# Patient Record
Sex: MALE | Race: WHITE | Employment: FULL TIME | URBAN - NONMETROPOLITAN AREA
[De-identification: names, ages, dates, MRNs, and addresses within clinical notes are randomized per-mention and may not be internally consistent; named-entity substitution may affect disease eponyms.]

---

## 2020-08-07 ENCOUNTER — VIRTUAL VISIT (OUTPATIENT)
Dept: FAMILY MEDICINE | Facility: OTHER | Age: 50
End: 2020-08-07
Attending: PHYSICIAN ASSISTANT
Payer: COMMERCIAL

## 2020-08-07 DIAGNOSIS — Z20.822 EXPOSURE TO COVID-19 VIRUS: Primary | ICD-10-CM

## 2020-08-07 DIAGNOSIS — Z20.822 COVID-19 RULED OUT: ICD-10-CM

## 2020-08-07 PROCEDURE — 99212 OFFICE O/P EST SF 10 MIN: CPT | Mod: 95 | Performed by: PHYSICIAN ASSISTANT

## 2020-08-07 PROCEDURE — U0003 INFECTIOUS AGENT DETECTION BY NUCLEIC ACID (DNA OR RNA); SEVERE ACUTE RESPIRATORY SYNDROME CORONAVIRUS 2 (SARS-COV-2) (CORONAVIRUS DISEASE [COVID-19]), AMPLIFIED PROBE TECHNIQUE, MAKING USE OF HIGH THROUGHPUT TECHNOLOGIES AS DESCRIBED BY CMS-2020-01-R: HCPCS | Mod: ZL | Performed by: PHYSICIAN ASSISTANT

## 2020-08-07 PROCEDURE — C9803 HOPD COVID-19 SPEC COLLECT: HCPCS

## 2020-08-07 PROCEDURE — 99207 ZZC NO CHARGE NURSE ONLY: CPT

## 2020-08-07 NOTE — NURSING NOTE
Patient was notified that a co worker tested positive for COVID.  Kayley Borges LPN ....................  8/7/2020   11:08 AM

## 2020-08-07 NOTE — NURSING NOTE
Chief Complaint   Patient presents with     Covid 19 Testing       Patient swabbed for COVID-19 testing.  Tomer Carvajal LPN on 8/7/2020 at 12:00 PM

## 2020-08-07 NOTE — PROGRESS NOTES
"Angel Self is a 49 year old male who is being evaluated via a billable telephone visit.      The patient has been notified of following:     \"This telephone visit will be conducted via a call between you and your physician/provider. We have found that certain health care needs can be provided without the need for a physical exam.  This service lets us provide the care you need with a short phone conversation.  If a prescription is necessary we can send it directly to your pharmacy.  If lab work is needed we can place an order for that and you can then stop by our lab to have the test done at a later time.    Telephone visits are billed at different rates depending on your insurance coverage. During this emergency period, for some insurers they may be billed the same as an in-person visit.  Please reach out to your insurance provider with any questions.    If during the course of the call the physician/provider feels a telephone visit is not appropriate, you will not be charged for this service.\"    Patient has given verbal consent for Telephone visit?  Yes    What phone number would you like to be contacted at? [phone]    How would you like to obtain your AVS? My Chart    Subjective     Angel Self is a 49 year old male who presents via phone visit today for the following health issues:    HPI  Patient is contacted via telephone for consideration of COVID-19 testing. Patient notes he has recently spent a prolonged period of time in close contact with a co-worker at work and recently found out that individual tested positive for COVID. He works at [employer]. He is currently asymptomatic. No history of pulmonary disease. Non smoker. Denies fever/chills, cough, sore throat, shortness of breath, wheezing, muscle or body aches, headaches, GI symptoms, rash.       PAST MEDICAL HISTORY: No past medical history on file.    PAST SURGICAL HISTORY: No past surgical history on file.    FAMILY HISTORY: No family " history on file.    SOCIAL HISTORY:   Social History     Tobacco Use     Smoking status: Never Smoker     Smokeless tobacco: Never Used   Substance Use Topics     Alcohol use: Yes      No Known Allergies    No current outpatient medications on file.     No current facility-administered medications for this visit.        Reviewed and updated as needed this visit by Provider         Review of Systems   Constitutional, HEENT, cardiovascular, pulmonary, gi and gu systems are negative, except as otherwise noted.       Objective   Reported vitals:  There were no vitals taken for this visit.   healthy, alert and no distress  PSYCH: Alert and oriented times 3; coherent speech, normal   rate and volume, able to articulate logical thoughts, able   to abstract reason, no tangential thoughts, no hallucinations   or delusions  His affect is normal  RESP: No cough, no audible wheezing, able to talk in full sentences  Remainder of exam unable to be completed due to telephone visits          Assessment/Plan:  1. Exposure to COVID-19 virus    2. COVID-19 ruled out      Patient meets criteria for COVID-19 testing. They are informed to self quarantine until results are available. They have been provided information to complete curbside testing. Will notify with results. If positive, patient is to self-quarantine at home for 14 days.       Phone call duration:  5 minutes    Pascale Mendez PA-C

## 2020-08-07 NOTE — PATIENT INSTRUCTIONS
"Discharge Instructions for COVID-19 Patients  You have--or may have--COVID-19. Please follow the instructions listed below.   If you have a weakened immune system, discuss with your doctor any other actions you need to take.  How can I protect others?  If you have symptoms (fever, cough, body aches or trouble breathing):    Stay home and away from others (self-isolate) until:  ? At least 10 days have passed since your symptoms started. And   ? You've had no fever--and no medicine that reduces fever--for 3 full days (72 hours). And   ? Your other symptoms have resolved (gotten better).  If you don't show symptoms, but testing showed that you have COVID-19:    Stay home and away from others (self-isolate) until at least 10 days have passed since the date of your first positive COVID-19 test.  During this time    Stay in your own room, even for meals. Use your own bathroom if you can.    Stay away from others in your home. No hugging, kissing or shaking hands. No visitors.    Don't go to work, school or anywhere else.    Clean \"high touch\" surfaces often (doorknobs, counters, handles). Use household cleaning spray or wipes. You'll find a full list of  on the EPA website: www.epa.gov/pesticide-registration/list-n-disinfectants-use-against-sars-cov-2.    Cover your mouth and nose with a mask, tissue or wash cloth to avoid spreading germs.    Wash your hands and face often. Use soap and water.    Caregivers in these groups are at risk for severe illness due to COVID-19:  ? People 65 years and older  ? People who live in a nursing home or long-term care facility  ? People with chronic disease (lung, heart, cancer, diabetes, kidney, liver, immunologic)  ? People who have a weakened immune system, including those who:    Are in cancer treatment    Take medicine that weakens the immune system, such as corticosteroids    Had a bone marrow or organ transplant    Have an immune deficiency    Have poorly controlled HIV or " AIDS    Are obese (body mass index of 40 or higher)    Smoke regularly    Caregivers should wear gloves while washing dishes, handling laundry and cleaning bedrooms and bathrooms.    Use caution when washing and drying laundry: Don't shake dirty laundry and use the warmest water setting that you can.    For more tips on managing your health at home, go to www.cdc.gov/coronavirus/2019-ncov/downloads/10Things.pdf.  How can I take care of myself at home?  1. Get lots of rest. Drink extra fluids (unless a doctor has told you not to).  2. Take Tylenol (acetaminophen) for fever or pain. If you have liver or kidney problems, ask your family doctor if it's okay to take Tylenol.     Adults can take either:  ? 650 mg (two 325 mg pills) every 4 to 6 hours, or   ? 1,000 mg (two 500 mg pills) every 8 hours as needed.  ? Note: Don't take more than 3,000 mg in one day. Acetaminophen is found in many medicines (both prescribed and over-the-counter medicines). Read all labels to be sure you don't take too much.   For children, check the Tylenol bottle for the right dose. The dose is based on the child's age or weight.  3. If you have other health problems (like cancer, heart failure, an organ transplant or severe kidney disease): Call your specialty clinic if you don't feel better in the next 2 days.  4. Know when to call 911. Emergency warning signs include:  ? Trouble breathing or shortness of breath  ? Pain or pressure in the chest that doesn't go away  ? Feeling confused like you haven't felt before, or not being able to wake up  ? Bluish-colored lips or face  5. Your doctor may have prescribed a blood thinner medicine. Follow their instructions.  Where can I get more information?     [facility] - About COVID-19:   [email or web address]    CDC - What to Do If You're Sick: www.cdc.gov/coronavirus/2019-ncov/about/steps-when-sick.html    CDC - Ending Home Isolation:  www.cdc.gov/coronavirus/2019-ncov/hcp/disposition-in-home-patients.html    CDC - Caring for Someone: www.cdc.gov/coronavirus/2019-ncov/if-you-are-sick/care-for-someone.html    [facility] - Interim Guidance for Hospital Discharge to Home: www.[facility].[place]/diseases/coronavirus/hcp/hospdischarge.pdf    [facility] clinical trials (COVID-19 research studies): clinicalaffairs.[facility]-clinical-trials    Below are the COVID-19 hotlines at the [place] Department of [facility] ([facility]). Interpreters are available.  ? For health questions: Call 899-676-1790 or 1-590.874.4024 (7 a.m. to 7 p.m.)  ? For questions about schools and childcare: Call 263-941-9781 or 1-183.894.3790 (7 a.m. to 7 p.m.)    For informational purposes only. Not to replace the advice of your health care provider. Clinically reviewed by the Infection Prevention Team.Copyright   2020 [facility]. All rights reserved. Accountable [id number] - 06/20.

## 2020-08-08 LAB
SARS-COV-2 RNA SPEC QL NAA+PROBE: NOT DETECTED
SPECIMEN SOURCE: NORMAL

## 2020-09-05 ENCOUNTER — OFFICE VISIT (OUTPATIENT)
Dept: FAMILY MEDICINE | Facility: OTHER | Age: 50
End: 2020-09-05
Attending: NURSE PRACTITIONER
Payer: COMMERCIAL

## 2020-09-05 VITALS
OXYGEN SATURATION: 98 % | HEART RATE: 84 BPM | RESPIRATION RATE: 16 BRPM | SYSTOLIC BLOOD PRESSURE: 124 MMHG | BODY MASS INDEX: 29.16 KG/M2 | TEMPERATURE: 99.2 F | DIASTOLIC BLOOD PRESSURE: 74 MMHG | HEIGHT: 73 IN | WEIGHT: 220 LBS

## 2020-09-05 DIAGNOSIS — M25.571 PAIN AND SWELLING OF RIGHT ANKLE: ICD-10-CM

## 2020-09-05 DIAGNOSIS — L03.115 CELLULITIS OF RIGHT ANKLE: Primary | ICD-10-CM
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
## 2020-09-05 DIAGNOSIS — M25.471 PAIN AND SWELLING OF RIGHT ANKLE: ICD-10-CM

## 2020-09-05 DIAGNOSIS — S80.811A CAT SCRATCH OF LOWER LEG, RIGHT, INITIAL ENCOUNTER: ICD-10-CM

## 2020-09-05 LAB
BASOPHILS # BLD AUTO: 0 10E9/L (ref 0–0.2)
BASOPHILS NFR BLD AUTO: 0.2 %
CRP SERPL-MCNC: 8.4 MG/L
DIFFERENTIAL METHOD BLD: NORMAL
EOSINOPHIL # BLD AUTO: 0 10E9/L (ref 0–0.7)
EOSINOPHIL NFR BLD AUTO: 0.1 %
ERYTHROCYTE [DISTWIDTH] IN BLOOD BY AUTOMATED COUNT: 12.7 % (ref 10–15)
HCT VFR BLD AUTO: 45 % (ref 40–53)
HGB BLD-MCNC: 15.3 G/DL (ref 13.3–17.7)
IMM GRANULOCYTES # BLD: 0.1 10E9/L (ref 0–0.4)
IMM GRANULOCYTES NFR BLD: 0.5 %
LYMPHOCYTES # BLD AUTO: 4 10E9/L (ref 0.8–5.3)
LYMPHOCYTES NFR BLD AUTO: 36.7 %
MCH RBC QN AUTO: 30.2 PG (ref 26.5–33)
MCHC RBC AUTO-ENTMCNC: 34 G/DL (ref 31.5–36.5)
MCV RBC AUTO: 89 FL (ref 78–100)
MONOCYTES # BLD AUTO: 1.1 10E9/L (ref 0–1.3)
MONOCYTES NFR BLD AUTO: 9.7 %
NEUTROPHILS # BLD AUTO: 5.8 10E9/L (ref 1.6–8.3)
NEUTROPHILS NFR BLD AUTO: 52.8 %
PLATELET # BLD AUTO: 367 10E9/L (ref 150–450)
RBC # BLD AUTO: 5.07 10E12/L (ref 4.4–5.9)
URATE SERPL-MCNC: 6.2 MG/DL (ref 4.4–7.6)
WBC # BLD AUTO: 11 10E9/L (ref 4–11)

## 2020-09-05 PROCEDURE — 96372 THER/PROPH/DIAG INJ SC/IM: CPT

## 2020-09-05 PROCEDURE — 86140 C-REACTIVE PROTEIN: CPT | Mod: ZL | Performed by: NURSE PRACTITIONER

## 2020-09-05 PROCEDURE — 99214 OFFICE O/P EST MOD 30 MIN: CPT | Performed by: NURSE PRACTITIONER

## 2020-09-05 PROCEDURE — 25000128 H RX IP 250 OP 636: Performed by: NURSE PRACTITIONER

## 2020-09-05 PROCEDURE — 25000125 ZZHC RX 250: Performed by: NURSE PRACTITIONER

## 2020-09-05 PROCEDURE — 36415 COLL VENOUS BLD VENIPUNCTURE: CPT | Mod: ZL | Performed by: NURSE PRACTITIONER

## 2020-09-05 PROCEDURE — 85025 COMPLETE CBC W/AUTO DIFF WBC: CPT | Mod: ZL | Performed by: NURSE PRACTITIONER

## 2020-09-05 PROCEDURE — 84550 ASSAY OF BLOOD/URIC ACID: CPT | Mod: ZL | Performed by: NURSE PRACTITIONER

## 2020-09-05 PROCEDURE — 86618 LYME DISEASE ANTIBODY: CPT | Mod: ZL | Performed by: NURSE PRACTITIONER

## 2020-09-05 RX ORDER — CEFTRIAXONE SODIUM 1 G
1 VIAL (EA) INJECTION ONCE
Status: COMPLETED | OUTPATIENT
Start: 2020-09-05 | End: 2020-09-05

## 2020-09-05 RX ORDER — NAPROXEN 500 MG/1
500 TABLET ORAL 2 TIMES DAILY WITH MEALS
Qty: 28 TABLET | Refills: 0 | Status: SHIPPED | OUTPATIENT
Start: 2020-09-05 | End: 2020-09-19

## 2020-09-05 RX ADMIN — LIDOCAINE HYDROCHLORIDE 1 G: 10 INJECTION, SOLUTION EPIDURAL; INFILTRATION; INTRACAUDAL; PERINEURAL at 11:21

## 2020-09-05 ASSESSMENT — MIFFLIN-ST. JEOR: SCORE: 1926.79

## 2020-09-05 ASSESSMENT — PAIN SCALES - GENERAL: PAINLEVEL: SEVERE PAIN (6)

## 2020-09-05 NOTE — NURSING NOTE
"Patient presents to the clinic today with right ankle pain and swelling.  Lorrie Chong LPN 9/5/2020   10:39 AM    Chief Complaint   Patient presents with     Musculoskeletal Problem       Initial /74 (BP Location: Right arm, Patient Position: Sitting, Cuff Size: Adult Regular)   Pulse 84   Temp 99.2  F (37.3  C) (Temporal)   Resp 16   Ht 1.854 m (6' 1\")   Wt 99.8 kg (220 lb)   SpO2 98%   BMI 29.03 kg/m   Estimated body mass index is 29.03 kg/m  as calculated from the following:    Height as of this encounter: 1.854 m (6' 1\").    Weight as of this encounter: 99.8 kg (220 lb).  Medication Reconciliation: complete  Lorrie Chong LPN    "

## 2020-09-05 NOTE — PROGRESS NOTES
"HPI:    Angel Self is a 49 year old male  who presents to [facility] today for ankle pain and swelling.    States he was walking last night, he normally walks 5 miles daily, he started to notice mild swelling, mild pain and mild erythema of his right ankle.  Worsening today with increased redness, swelling, and pain is starting to radiate up the medial side of shin.   No fevers or chills.  No numbness or tingling in right lower extremity.  Denies any weakness in right ankle.   States he did get scratched by his cat on the right ankle prior to the symptoms starting, states the scratch was more of a puncture with the claw that marleni blood.  He was also outside yesterday cleaning windows, etc prior to the symptoms starting but denies any known insect sting or spider bite.  No known tick bites this season.  No hx of gout.  Soaked last night for about 15 minutes in Epsom salt water soak without relief.  Not taking anything for pain or swelling.  Last tetanus 2/13/14        History reviewed. No pertinent past medical history.  History reviewed. No pertinent surgical history.  Social History     Tobacco Use     Smoking status: Never Smoker     Smokeless tobacco: Never Used   Substance Use Topics     Alcohol use: Not Currently     No current outpatient medications on file.     No Known Allergies      Past medical history, past surgical history, current medications and allergies reviewed and accurate to the best of my knowledge.        ROS:  Refer to HPI    /74 (BP Location: Right arm, Patient Position: Sitting, Cuff Size: Adult Regular)   Pulse 84   Temp 99.2  F (37.3  C) (Temporal)   Resp 16   Ht 1.854 m (6' 1\")   Wt 99.8 kg (220 lb)   SpO2 98%   BMI 29.03 kg/m      EXAM:  General Appearance: Well appearing adult male, non ill appearance, appropriate appearance for age. No acute distress  Ears: hearing intact to normal voice tone.  Eyes: conjunctivae normal without erythema or irritation, corneas clear, " no drainage or crusting, no eyelid swelling, pupils equal   Orophayrnx: voice clear.    Respiratory: normal chest wall and respirations.  Normal effort. No cough appreciated.  Cardiovascular:  CMS intact to right lower extremity, capillary refill less than 2 seconds, palpable pedal pulse, right calf soft without tenderness.    Musculoskeletal:  Equal movement of bilateral upper extremities.  Equal movement of bilateral lower extremities.  Right anterior ankle with generalized swelling and tenderness.  Right lateral malleolus with mild swelling, non tender.  Right medial malleolus with mild swelling, non tender.  Right distal shin with mild tenderness along the anterior medial side.  Active ROM intact to right ankle with mild guarding.  Strength intact to right ankle with flexion and extension.  Able to wiggle right toes without difficulty.   Normal gait.   Dermatological: Right anterior ankle with bright erythematous patch measuring 10 cm x 12 cm with darker erythema at center, no visible scratches, abrasions, puncture wounds, pustules or vesicles; skin is warm to touch; skin is taunt due to swelling, no pitting or fluctuance.  See attached photo.    Psychological: normal affect, alert, oriented, and pleasant.      Right ankle              Labs:  Results for orders placed or performed in visit on 09/05/20   Uric acid     Status: None   Result Value Ref Range    Uric Acid 6.2 4.4 - 7.6 mg/dL   CRP inflammation     Status: None   Result Value Ref Range    CRP Inflammation 8.4 <10.0 mg/L   CBC and Differential     Status: None   Result Value Ref Range    WBC 11.0 4.0 - 11.0 10e9/L    RBC Count 5.07 4.4 - 5.9 10e12/L    Hemoglobin 15.3 13.3 - 17.7 g/dL    Hematocrit 45.0 40.0 - 53.0 %    MCV 89 78 - 100 fl    MCH 30.2 26.5 - 33.0 pg    MCHC 34.0 31.5 - 36.5 g/dL    RDW 12.7 10.0 - 15.0 %    Platelet Count 367 150 - 450 10e9/L    Diff Method Automated Method     % Neutrophils 52.8 %    % Lymphocytes 36.7 %    % Monocytes  9.7 %    % Eosinophils 0.1 %    % Basophils 0.2 %    % Immature Granulocytes 0.5 %    Absolute Neutrophil 5.8 1.6 - 8.3 10e9/L    Absolute Lymphocytes 4.0 0.8 - 5.3 10e9/L    Absolute Monocytes 1.1 0.0 - 1.3 10e9/L    Absolute Eosinophils 0.0 0.0 - 0.7 10e9/L    Absolute Basophils 0.0 0.0 - 0.2 10e9/L    Abs Immature Granulocytes 0.1 0 - 0.4 10e9/L             ASSESSMENT/PLAN:  Discussed with patient most likely diagnosis including:  Cellulitis secondary to cat scratch/puncture from cat claw - most likely diagnosis  Large local reaction to insect sting  Spider bite  Erythema migrans  Gouty arthritis    CBC - normal counts   CRP - 8.4 - Non elevated (normal less than 10)  Uric Acid - 6.2  Non elevated (normal less than 7.6)  Lyme test pending    Will treat for suspected cellulitis secondary to cat scratch/puncture wound from cat claw with Rocephin 1 gram IM injection and Augmentin 875-125 mg BID x 14 days.  Naproxen 500 mg BID for inflammation and pain    Continue epsom salt water soaks BID to TID  Elevate to reduce swelling    Discussed warning signs/symptoms indicative of need to f/u - worsening redness, worsening swelling, worsening pain, joint stiffness, fevers, chills, or any concerns.    Follow up for recheck in 2 days if no improvement in symptoms or at any time if worsening or concerns    1. Cellulitis of right ankle    - CBC and Differential; Future  - CBC and Differential  - CRP inflammation; Future  - CRP inflammation  - Uric acid; Future  - Uric acid  - Lyme Disease Ab with reflex to WB Serum; Future  - Lyme Disease Ab with reflex to WB Serum    - amoxicillin-clavulanate (AUGMENTIN) 875-125 MG tablet; Take 1 tablet by mouth 2 times daily for 14 days  Dispense: 28 tablet; Refill: 0    - cefTRIAXone (ROCEPHIN) 1 g in lidocaine (PF) (XYLOCAINE) 1 % injection IM injection x 1 administered in clinic.    2. Pain and swelling of right ankle    - CBC and Differential; Future  - CBC and Differential  - CRP  inflammation; Future  - CRP inflammation  - Uric acid; Future  - Uric acid  - Lyme Disease Ab with reflex to WB Serum; Future  - Lyme Disease Ab with reflex to WB Serum    - naproxen (NAPROSYN) 500 MG tablet; Take 1 tablet (500 mg) by mouth 2 times daily (with meals) for 14 days  Dispense: 28 tablet; Refill: 0    3. Cat scratch of lower leg, right, initial encounter    - amoxicillin-clavulanate (AUGMENTIN) 875-125 MG tablet; Take 1 tablet by mouth 2 times daily for 14 days  Dispense: 28 tablet; Refill: 0          Cesilia Luke NP on 9/5/2020 at 11:47 AM        I explained my diagnostic considerations and recommendations to the patient, who voiced understanding and agreement with the treatment plan. All questions were answered. We discussed potential side effects of any prescribed or recommended therapies, as well as expectations for response to treatments.    Disclaimer:  This note consists of words and symbols derived from keyboarding, dictation, or using voice recognition software. As a result, there may be errors in the script that have gone undetected. Please consider this when interpreting information found in this note.

## 2020-09-05 NOTE — PATIENT INSTRUCTIONS
Rocephin injection - antibiotic  Labs - will call with any concerns    Augmentin twice daily x 14 days  Naproxen twice daily for pain and swelling    Elevate  Warm epsom salt water soaks 2 to 3 day    Monitor the area of redness, follow up if worsening

## 2020-09-06 LAB — B BURGDOR IGG+IGM SER QL: 0.76 (ref 0–0.89)

## 2020-11-16 ENCOUNTER — HEALTH MAINTENANCE LETTER (OUTPATIENT)
Age: 50
End: 2020-11-16

## 2021-09-18 ENCOUNTER — HEALTH MAINTENANCE LETTER (OUTPATIENT)
Age: 51
End: 2021-09-18

## 2022-01-08 ENCOUNTER — HEALTH MAINTENANCE LETTER (OUTPATIENT)
Age: 52
End: 2022-01-08

## 2022-11-20 ENCOUNTER — HEALTH MAINTENANCE LETTER (OUTPATIENT)
Age: 52
End: 2022-11-20

## 2023-04-15 ENCOUNTER — HEALTH MAINTENANCE LETTER (OUTPATIENT)
Age: 53
End: 2023-04-15

## (undated) RX ORDER — CEFTRIAXONE SODIUM 1 G
VIAL (EA) INJECTION
Status: DISPENSED
Start: 2020-09-05

## (undated) RX ORDER — LIDOCAINE HYDROCHLORIDE 10 MG/ML
INJECTION, SOLUTION INFILTRATION; PERINEURAL
Status: DISPENSED
Start: 2020-09-05